# Patient Record
Sex: MALE | Race: WHITE | NOT HISPANIC OR LATINO | URBAN - METROPOLITAN AREA
[De-identification: names, ages, dates, MRNs, and addresses within clinical notes are randomized per-mention and may not be internally consistent; named-entity substitution may affect disease eponyms.]

---

## 2017-05-16 ENCOUNTER — EMERGENCY (EMERGENCY)
Facility: HOSPITAL | Age: 77
LOS: 1 days | Discharge: HOME | End: 2017-05-16
Attending: EMERGENCY MEDICINE | Admitting: EMERGENCY MEDICINE
Payer: COMMERCIAL

## 2017-05-16 VITALS
RESPIRATION RATE: 18 BRPM | DIASTOLIC BLOOD PRESSURE: 80 MMHG | SYSTOLIC BLOOD PRESSURE: 132 MMHG | HEART RATE: 58 BPM | OXYGEN SATURATION: 97 % | TEMPERATURE: 98 F

## 2017-05-16 VITALS
RESPIRATION RATE: 18 BRPM | WEIGHT: 166.01 LBS | SYSTOLIC BLOOD PRESSURE: 135 MMHG | HEART RATE: 58 BPM | TEMPERATURE: 99 F | OXYGEN SATURATION: 95 % | HEIGHT: 72 IN | DIASTOLIC BLOOD PRESSURE: 82 MMHG

## 2017-05-16 DIAGNOSIS — Z88.8 ALLERGY STATUS TO OTHER DRUGS, MEDICAMENTS AND BIOLOGICAL SUBSTANCES STATUS: ICD-10-CM

## 2017-05-16 DIAGNOSIS — R32 UNSPECIFIED URINARY INCONTINENCE: ICD-10-CM

## 2017-05-16 DIAGNOSIS — M54.5 LOW BACK PAIN: ICD-10-CM

## 2017-05-16 LAB
ALBUMIN SERPL ELPH-MCNC: 3.6 G/DL — SIGNIFICANT CHANGE UP (ref 3.4–5)
ALP SERPL-CCNC: 60 U/L — SIGNIFICANT CHANGE UP (ref 40–120)
ALT FLD-CCNC: 31 U/L — SIGNIFICANT CHANGE UP (ref 12–42)
ANION GAP SERPL CALC-SCNC: 5 MMOL/L — LOW (ref 9–16)
APPEARANCE UR: CLEAR — SIGNIFICANT CHANGE UP
AST SERPL-CCNC: 17 U/L — SIGNIFICANT CHANGE UP (ref 15–37)
BACTERIA # UR AUTO: PRESENT /HPF
BASOPHILS NFR BLD AUTO: 0.2 % — SIGNIFICANT CHANGE UP (ref 0–2)
BILIRUB SERPL-MCNC: 0.4 MG/DL — SIGNIFICANT CHANGE UP (ref 0.2–1.2)
BILIRUB UR-MCNC: NEGATIVE — SIGNIFICANT CHANGE UP
BUN SERPL-MCNC: 24 MG/DL — HIGH (ref 7–23)
CALCIUM SERPL-MCNC: 8.9 MG/DL — SIGNIFICANT CHANGE UP (ref 8.5–10.5)
CHLORIDE SERPL-SCNC: 108 MMOL/L — SIGNIFICANT CHANGE UP (ref 96–108)
CO2 SERPL-SCNC: 30 MMOL/L — SIGNIFICANT CHANGE UP (ref 22–31)
COLOR SPEC: YELLOW — SIGNIFICANT CHANGE UP
CREAT SERPL-MCNC: 1.1 MG/DL — SIGNIFICANT CHANGE UP (ref 0.5–1.3)
DIFF PNL FLD: (no result)
EOSINOPHIL NFR BLD AUTO: 0.7 % — SIGNIFICANT CHANGE UP (ref 0–6)
EPI CELLS # UR: SIGNIFICANT CHANGE UP /HPF
EXTRA BLUE TOP TUBE: SIGNIFICANT CHANGE UP
GLUCOSE SERPL-MCNC: 91 MG/DL — SIGNIFICANT CHANGE UP (ref 70–99)
GLUCOSE UR QL: NEGATIVE — SIGNIFICANT CHANGE UP
HCT VFR BLD CALC: 40.8 % — SIGNIFICANT CHANGE UP (ref 39–50)
HGB BLD-MCNC: 14 G/DL — SIGNIFICANT CHANGE UP (ref 13–17)
KETONES UR-MCNC: (no result) MG/DL
LEUKOCYTE ESTERASE UR-ACNC: NEGATIVE — SIGNIFICANT CHANGE UP
LYMPHOCYTES # BLD AUTO: 17.5 % — SIGNIFICANT CHANGE UP (ref 13–44)
MCHC RBC-ENTMCNC: 31.5 PG — SIGNIFICANT CHANGE UP (ref 27–34)
MCHC RBC-ENTMCNC: 34.3 G/DL — SIGNIFICANT CHANGE UP (ref 32–36)
MCV RBC AUTO: 91.7 FL — SIGNIFICANT CHANGE UP (ref 80–100)
MONOCYTES NFR BLD AUTO: 5.9 % — SIGNIFICANT CHANGE UP (ref 2–14)
NEUTROPHILS NFR BLD AUTO: 75.7 % — SIGNIFICANT CHANGE UP (ref 43–77)
NITRITE UR-MCNC: NEGATIVE — SIGNIFICANT CHANGE UP
PH UR: 5.5 — SIGNIFICANT CHANGE UP (ref 5–8)
PLATELET # BLD AUTO: 190 K/UL — SIGNIFICANT CHANGE UP (ref 150–400)
POTASSIUM SERPL-MCNC: 3.7 MMOL/L — SIGNIFICANT CHANGE UP (ref 3.5–5.3)
POTASSIUM SERPL-SCNC: 3.7 MMOL/L — SIGNIFICANT CHANGE UP (ref 3.5–5.3)
PROT SERPL-MCNC: 6.8 G/DL — SIGNIFICANT CHANGE UP (ref 6.4–8.2)
PROT UR-MCNC: (no result) MG/DL
RBC # BLD: 4.45 M/UL — SIGNIFICANT CHANGE UP (ref 4.2–5.8)
RBC # FLD: 14.2 % — SIGNIFICANT CHANGE UP (ref 10.3–16.9)
RBC CASTS # UR COMP ASSIST: < 5 /HPF — SIGNIFICANT CHANGE UP
SODIUM SERPL-SCNC: 143 MMOL/L — SIGNIFICANT CHANGE UP (ref 135–145)
SP GR SPEC: 1.02 — SIGNIFICANT CHANGE UP (ref 1–1.03)
UROBILINOGEN FLD QL: 0.2 E.U./DL — SIGNIFICANT CHANGE UP
WBC # BLD: 16.4 K/UL — HIGH (ref 3.8–10.5)
WBC # FLD AUTO: 16.4 K/UL — HIGH (ref 3.8–10.5)
WBC UR QL: < 5 /HPF — SIGNIFICANT CHANGE UP

## 2017-05-16 PROCEDURE — 87086 URINE CULTURE/COLONY COUNT: CPT

## 2017-05-16 PROCEDURE — 72148 MRI LUMBAR SPINE W/O DYE: CPT

## 2017-05-16 PROCEDURE — 86140 C-REACTIVE PROTEIN: CPT

## 2017-05-16 PROCEDURE — 85025 COMPLETE CBC W/AUTO DIFF WBC: CPT

## 2017-05-16 PROCEDURE — 99284 EMERGENCY DEPT VISIT MOD MDM: CPT | Mod: 25

## 2017-05-16 PROCEDURE — 72148 MRI LUMBAR SPINE W/O DYE: CPT | Mod: 26

## 2017-05-16 PROCEDURE — 85652 RBC SED RATE AUTOMATED: CPT

## 2017-05-16 PROCEDURE — 80053 COMPREHEN METABOLIC PANEL: CPT

## 2017-05-16 PROCEDURE — 36415 COLL VENOUS BLD VENIPUNCTURE: CPT

## 2017-05-16 PROCEDURE — 99285 EMERGENCY DEPT VISIT HI MDM: CPT

## 2017-05-16 PROCEDURE — 81001 URINALYSIS AUTO W/SCOPE: CPT

## 2017-05-16 NOTE — ED ADULT NURSE NOTE - OBJECTIVE STATEMENT
received pt in room 6. A&Ox3, presents to ed for c.o incontinence since this am. pt reports receiving transforaminal epidural steroid injection at L3-4,L4-5 on the right yesterday. denies abd pain. no n/v/d. no fevers or chills. pt able to ambulate independently. iv placed, labs drawn. awaiting md ogden. received pt in room 6. A&Ox3, presents to ed for c.o urinary incontinence with pain on urination since this am. pt reports receiving transforaminal epidural steroid injection at L3-4,L4-5 on the right yesterday. denies abd pain. no n/v/d. no fevers or chills. denies back pain. pt able to ambulate independently. iv placed, labs drawn. awaiting md ogden.

## 2017-05-16 NOTE — ED PROVIDER NOTE - OBJECTIVE STATEMENT
76M with episode of urinary incontinence associated with penile pain/low back pain, hx of lumbar disc surgery, low back pain. Pt had injection to low back by pain management Dr. Toledo 2 days ago, pt has had similar injections for pain without issue. Pt denies any leg weakness, denies any bowel incontinence, denies any decrease in sensation to groin area/no saddle anesthesia. Able to ambulate w/o difficulty, pain free currently.

## 2017-05-16 NOTE — ED ADULT NURSE REASSESSMENT NOTE - NS ED NURSE REASSESS COMMENT FT1
urology at bedside evaluating pt. Will continue to monitor, Neurosurgery at bedside evaluating pt. Will continue to monitor,

## 2017-05-16 NOTE — ED ADULT NURSE NOTE - NS ED NURSE ELOPE COMMENTS
Pt was awaiting for MRI results, pt requesting to leave before the receiving results and to removed his IV line, corina removed, pt was  advised by MD Morin to wait for results. Pt was evaluated by Neuro surgery, and was not found on bed.

## 2017-05-16 NOTE — ED PROVIDER NOTE - PROGRESS NOTE DETAILS
Spoke to radiology resident, no e/o epidural collection on prelim read, will send to [facility]. Pt evaluated by neurosurgery, removed pt's IV as per request. Appreciate evaluation by АНДРЕЙ Richmond who reviewed imaging Pt does not want to wait for formal results of MRI. Understands risks of leaving including missed neurologic emergency, cauda equina, paralysis; however, agrees to call in AM regarding results and prefers to f/u with NSG at [facility]. Discharged with strict return precautions, does not want to wait for paperwork. Spoke to radiology resident, no e/o epidural collection no e/o cauda equina on prelim read, will send to [facility]. Pt evaluated by neurosurgery, removed pt's IV as per request. Appreciate evaluation by АНДРЕЙ Richmond who reviewed imaging Pt does not want to wait for formal results of MRI. Understands risks of leaving including missed neurologic emergency, cauda equina, paralysis; however, agrees to call in AM regarding results and prefers to f/u with NSG at [facility]. Discharged with strict return precautions, does not want to wait for paperwork. Pt left note with phone number [phone] UA + for heme conveyed results to patient, can consider kidney stone although pt denies back pain. Will f/u with PMD. Spoke to Dr. Max from [facility]. There is evidence of severe compression of nerve roots L3-L5 level, concern for epidural/CSF signal collection. Concern for possible epidural hematoma, concern for cauda equina syndrome. Pt evaluated by neurosurgery, removed pt's IV as per request. Appreciate evaluation by АНДРЕЙ Richmond who reviewed imaging Pt does not want to wait for formal results of MRI. Understands risks of leaving including missed neurologic emergency, cauda equina, paralysis; however, agrees to call in AM regarding results and prefers to f/u with NSG at [facility]. Discharged with strict return precautions, does not want to wait for paperwork. Pt left note with phone number [phone] stating his intention to leave. Spoke to Dr. Max from [facility]. There is evidence of severe compression of nerve roots L3-L5 level, concern for epidural/CSF signal collection. Concern for possible epidural hematoma, concern for cauda equina syndrome. Attempted to leave two messages for patient, patient's family member. Pt evaluated by neurosurgery, removed pt's IV as per request. Appreciate evaluation by АНДРЕЙ Richmond who reviewed imaging. Pt does not want to wait for formal results of MRI. Understands risks of leaving including missed neurologic emergency, cauda equina, paralysis. Pt does oes not want to wait for paperwork. Pt left note on MDs chair with phone number [phone] stating his intention to leave. Pt eloped prior to receiving formal results of MRI. Spoke to NSG, pt sent in by Dr. Toledo for MRI, ordered MR of lumbar spine no contrast required. Spoke to MRI tech, as per MRI tech, pt to have MRI in 1 hour approximately. Spoke to radiology resident, difficult for prelim read given previous surgery, will send to [facility]. Pt states he wants to leave, explained concern with urinary incontinence, possibility of cauda equina that could lead to neuro deficit/paralysis if left untreated. Pt states he wants to leave, understands risks of missed diagnosis but states he can follow upw ith [facility]. Pt evaluated by neurosurgery, removed pt's IV as per request. Appreciate evaluation by АНДРЕЙ Richmond who reviewed imaging. Pt does not want to wait for formal results of MRI. Understands risks of leaving including missed neurologic emergency, cauda equina, paralysis. Pt does not want to wait for paperwork. Pt left note on my chair with phone number [phone] stating his intention to leave. Pt eloped prior to receiving formal results of MRI.

## 2017-05-16 NOTE — ED PROVIDER NOTE - MEDICAL DECISION MAKING DETAILS
76M with hx of back pain, lumbar spinal surgery, recent injection presenting with episode of urinary incontinence. Neuro exam wnl. DDx includes: doubt SEA, sed rate/crp wnl, plan for MRI of lumbar spine. Can consider spinal epidural hematoma, can also consider cauda equina. 76M with hx of back pain, lumbar spinal surgery, recent injection presenting with episode of urinary incontinence. Neuro exam wnl. DDx includes: doubt SEA, sed rate/crp wnl, plan for MRI of lumbar spine. Can consider spinal epidural hematoma, can also consider cauda equina, will obtain MRI.     MRI on prelim read does not show any concerning collection, cauda equina. HOwever, pt does not want to wait for formal results, evaluated by NSG who also reviewed imaging, pt discharged does not want to wait for paperwork. Given strict return precautions and understands/reiterates risks of leaving without formal results. 76M with hx of back pain, lumbar spinal surgery, recent injection presenting with episode of urinary incontinence. Neuro exam wnl. DDx includes: doubt SEA, sed rate/crp wnl, plan for MRI of lumbar spine. Can consider spinal epidural hematoma, can also consider cauda equina, will obtain MRI emergently.    MRI on prelim read difficult to interpret as per rads, sent to [facility]. However, pt does not want to wait for formal results, evaluated by NSG who also reviewed imaging, pt eloped prior to receiving formal MRI results.

## 2017-05-16 NOTE — ED PROVIDER NOTE - MUSCULOSKELETAL, MLM
Spine appears normal, range of motion is not limited, no muscle or joint tenderness no c-s spine ttp, no redness/erythema/warmth to low back

## 2017-05-16 NOTE — ED ADULT NURSE REASSESSMENT NOTE - NS ED NURSE REASSESS COMMENT FT1
Pt endorsed to me from previous shift, A&O x4, denies any pain at this time, VS stable, awaiting MRI. Will continue to monitor,

## 2017-05-16 NOTE — ED ADULT TRIAGE NOTE - ARRIVAL INFO ADDITIONAL COMMENTS
Pt presented to ED with incontinence and burning urination. Pt denies numbness nor tingling to lower extremities, pt is bale to ambulate with steady gait.

## 2017-05-17 ENCOUNTER — APPOINTMENT (OUTPATIENT)
Dept: NEUROSURGERY | Facility: CLINIC | Age: 77
End: 2017-05-17

## 2017-05-17 DIAGNOSIS — S06.4X9A EPIDURAL HEMORRHAGE WITH LOSS OF CONSCIOUSNESS OF UNSPECIFIED DURATION, INITIAL ENCOUNTER: ICD-10-CM

## 2017-05-17 DIAGNOSIS — Z78.9 OTHER SPECIFIED HEALTH STATUS: ICD-10-CM

## 2017-05-17 NOTE — CONSULT NOTE ADULT - SUBJECTIVE AND OBJECTIVE BOX
76 male with hx. of chronic back pain, s/p lumbar spine surgery x 2 in the 90's. He received 2 or 3 shots of lumbar epidural injections yesterday by Dr. Toledo his pain management MD. There were no complications, Patient walked home. This morning he had burning like pain when he urinated dark yellow urine. After that he had 2 episodes of urinary incontinence. He did not have any weakness of lower extremities. He was to ER for a MRI.     In Er, patient states that urinary incontinence is resolved, he denies back pain, denies saddle/perineal anesthesia.  Final MRI result came after patient left ER refusing to wait for it: Fluid collection in epidural space compressing thecal sac.  It may be the the injection but hematoma  can not be rule out.  PE: A&O x 3, PERRL, CN II to XII are grossly intact.   Back: no point tenderness, no palpable fluid collection.  No saddle anesthesia.   Ext: no focal motor deficit, 5/5 x 4.  No sensory deficit to touch.  Plan: Patient states that urinary complaint is resolved and that he is not willing to wait for final report of MRI, nor to be admitted.   Patient was notified of results of MRI via phone.  He is advised to see Dr. Hill today in his office.   I personally called the patient and left a message for him to call    [phone] for him to call today.

## 2017-05-17 NOTE — ED POST DISCHARGE NOTE - RESULT SUMMARY
MRI possible cauda equina pt called and no symptoms of such but will call dr tovar and LAYLA in office today

## 2017-05-18 LAB
CULTURE RESULTS: NO GROWTH — SIGNIFICANT CHANGE UP
SPECIMEN SOURCE: SIGNIFICANT CHANGE UP

## 2017-05-22 PROBLEM — S06.4X9A EPIDURAL HEMATOMA: Status: ACTIVE | Noted: 2017-05-22

## 2017-05-22 PROBLEM — Z78.9 SOCIAL ALCOHOL USE: Status: ACTIVE | Noted: 2017-05-19

## 2017-05-22 PROBLEM — Z78.9 DOES NOT USE ILLICIT DRUGS: Status: ACTIVE | Noted: 2017-05-19
